# Patient Record
Sex: FEMALE | Race: WHITE | NOT HISPANIC OR LATINO | URBAN - METROPOLITAN AREA
[De-identification: names, ages, dates, MRNs, and addresses within clinical notes are randomized per-mention and may not be internally consistent; named-entity substitution may affect disease eponyms.]

---

## 2023-01-26 ENCOUNTER — APPOINTMENT (RX ONLY)
Dept: URBAN - METROPOLITAN AREA CLINIC 123 | Facility: CLINIC | Age: 38
Setting detail: DERMATOLOGY
End: 2023-01-26

## 2023-01-26 DIAGNOSIS — L82.1 OTHER SEBORRHEIC KERATOSIS: ICD-10-CM

## 2023-01-26 DIAGNOSIS — Z71.89 OTHER SPECIFIED COUNSELING: ICD-10-CM

## 2023-01-26 DIAGNOSIS — L82.0 INFLAMED SEBORRHEIC KERATOSIS: ICD-10-CM

## 2023-01-26 DIAGNOSIS — L81.4 OTHER MELANIN HYPERPIGMENTATION: ICD-10-CM

## 2023-01-26 DIAGNOSIS — D18.0 HEMANGIOMA: ICD-10-CM

## 2023-01-26 DIAGNOSIS — D22 MELANOCYTIC NEVI: ICD-10-CM

## 2023-01-26 DIAGNOSIS — L81.3 CAFÉ AU LAIT SPOTS: ICD-10-CM

## 2023-01-26 DIAGNOSIS — L72.0 EPIDERMAL CYST: ICD-10-CM

## 2023-01-26 PROBLEM — D22.61 MELANOCYTIC NEVI OF RIGHT UPPER LIMB, INCLUDING SHOULDER: Status: ACTIVE | Noted: 2023-01-26

## 2023-01-26 PROBLEM — D22.71 MELANOCYTIC NEVI OF RIGHT LOWER LIMB, INCLUDING HIP: Status: ACTIVE | Noted: 2023-01-26

## 2023-01-26 PROBLEM — D18.01 HEMANGIOMA OF SKIN AND SUBCUTANEOUS TISSUE: Status: ACTIVE | Noted: 2023-01-26

## 2023-01-26 PROCEDURE — ? BENIGN DESTRUCTION

## 2023-01-26 PROCEDURE — ? OBSERVATION AND MEASURE

## 2023-01-26 PROCEDURE — 17110 DESTRUCTION B9 LES UP TO 14: CPT

## 2023-01-26 PROCEDURE — 99203 OFFICE O/P NEW LOW 30 MIN: CPT | Mod: 25

## 2023-01-26 PROCEDURE — ? PRESCRIPTION MEDICATION MANAGEMENT

## 2023-01-26 PROCEDURE — ? COUNSELING

## 2023-01-26 ASSESSMENT — LOCATION DETAILED DESCRIPTION DERM
LOCATION DETAILED: RIGHT LATERAL 3RD TOE
LOCATION DETAILED: RIGHT DISTAL POSTERIOR UPPER ARM
LOCATION DETAILED: RIGHT INFERIOR LATERAL NECK
LOCATION DETAILED: PERIUMBILICAL SKIN
LOCATION DETAILED: RIGHT ANTERIOR SHOULDER
LOCATION DETAILED: RIGHT DISTAL CALF
LOCATION DETAILED: EPIGASTRIC SKIN
LOCATION DETAILED: LEFT CENTRAL MALAR CHEEK

## 2023-01-26 ASSESSMENT — LOCATION SIMPLE DESCRIPTION DERM
LOCATION SIMPLE: RIGHT CALF
LOCATION SIMPLE: RIGHT SHOULDER
LOCATION SIMPLE: ABDOMEN
LOCATION SIMPLE: RIGHT POSTERIOR UPPER ARM
LOCATION SIMPLE: LEFT CHEEK
LOCATION SIMPLE: RIGHT ANTERIOR NECK
LOCATION SIMPLE: RIGHT 3RD TOE

## 2023-01-26 ASSESSMENT — LOCATION ZONE DERM
LOCATION ZONE: NECK
LOCATION ZONE: LEG
LOCATION ZONE: TRUNK
LOCATION ZONE: TOE
LOCATION ZONE: ARM
LOCATION ZONE: FACE

## 2023-01-26 NOTE — PROCEDURE: PRESCRIPTION MEDICATION MANAGEMENT
Render In Strict Bullet Format?: No
Detail Level: Zone
Plan: Discussed treatment options including facials (with extraction) and retinols (or tretinoin) if bothersome. \\nDifferin: Apply a pea-sized amount to a clean, dry face qhs (pt prefers).

## 2023-01-26 NOTE — PROCEDURE: BENIGN DESTRUCTION
Add 52 Modifier (Optional): no
Detail Level: Simple
Anesthesia Volume In Cc: 0.2
Treatment Number (Will Not Render If 0): 1
Medical Necessity Information: It is in your best interest to select a reason for this procedure from the list below. All of these items fulfill various CMS LCD requirements except the new and changing color options.
Post-Care Instructions: I reviewed with the patient in detail post-care instructions. Patient is to wear sunprotection, and avoid picking at any of the treated lesions. Pt may apply Vaseline to crusted or scabbing areas.
Consent: The patient's consent was obtained including but not limited to risks of crusting, scabbing, blistering, scarring, darker or lighter pigmentary change, recurrence, incomplete removal and infection.
Medical Necessity Clause: This procedure was medically necessary because the lesions that were treated were:

## 2024-01-29 ENCOUNTER — APPOINTMENT (RX ONLY)
Dept: URBAN - METROPOLITAN AREA CLINIC 123 | Facility: CLINIC | Age: 39
Setting detail: DERMATOLOGY
End: 2024-01-29

## 2024-01-29 DIAGNOSIS — L81.3 CAFÉ AU LAIT SPOTS: ICD-10-CM | Status: STABLE

## 2024-01-29 DIAGNOSIS — Z71.89 OTHER SPECIFIED COUNSELING: ICD-10-CM

## 2024-01-29 DIAGNOSIS — L81.4 OTHER MELANIN HYPERPIGMENTATION: ICD-10-CM

## 2024-01-29 DIAGNOSIS — D22 MELANOCYTIC NEVI: ICD-10-CM

## 2024-01-29 DIAGNOSIS — L82.1 OTHER SEBORRHEIC KERATOSIS: ICD-10-CM

## 2024-01-29 DIAGNOSIS — D18.0 HEMANGIOMA: ICD-10-CM

## 2024-01-29 PROBLEM — D22.72 MELANOCYTIC NEVI OF LEFT LOWER LIMB, INCLUDING HIP: Status: ACTIVE | Noted: 2024-01-29

## 2024-01-29 PROBLEM — D48.5 NEOPLASM OF UNCERTAIN BEHAVIOR OF SKIN: Status: ACTIVE | Noted: 2024-01-29

## 2024-01-29 PROBLEM — D22.5 MELANOCYTIC NEVI OF TRUNK: Status: ACTIVE | Noted: 2024-01-29

## 2024-01-29 PROBLEM — D18.01 HEMANGIOMA OF SKIN AND SUBCUTANEOUS TISSUE: Status: ACTIVE | Noted: 2024-01-29

## 2024-01-29 PROBLEM — D22.71 MELANOCYTIC NEVI OF RIGHT LOWER LIMB, INCLUDING HIP: Status: ACTIVE | Noted: 2024-01-29

## 2024-01-29 PROCEDURE — ? OBSERVATION

## 2024-01-29 PROCEDURE — 11102 TANGNTL BX SKIN SINGLE LES: CPT

## 2024-01-29 PROCEDURE — 99213 OFFICE O/P EST LOW 20 MIN: CPT | Mod: 25

## 2024-01-29 PROCEDURE — ? BIOPSY BY SHAVE METHOD

## 2024-01-29 PROCEDURE — ? OBSERVATION AND MEASURE

## 2024-01-29 PROCEDURE — ? COUNSELING

## 2024-01-29 ASSESSMENT — LOCATION SIMPLE DESCRIPTION DERM
LOCATION SIMPLE: RIGHT 3RD TOE
LOCATION SIMPLE: ABDOMEN
LOCATION SIMPLE: RIGHT CHEEK
LOCATION SIMPLE: LEFT UPPER BACK
LOCATION SIMPLE: RIGHT UPPER BACK
LOCATION SIMPLE: LEFT UPPER ARM
LOCATION SIMPLE: CHEST
LOCATION SIMPLE: RIGHT ANTERIOR NECK
LOCATION SIMPLE: LEFT FOOT

## 2024-01-29 ASSESSMENT — LOCATION DETAILED DESCRIPTION DERM
LOCATION DETAILED: LEFT DORSAL FOOT
LOCATION DETAILED: RIGHT INFERIOR LATERAL NECK
LOCATION DETAILED: RIGHT MEDIAL SUPERIOR CHEST
LOCATION DETAILED: RIGHT LATERAL ABDOMEN
LOCATION DETAILED: RIGHT LATERAL 3RD TOE
LOCATION DETAILED: LEFT DISTAL POSTERIOR UPPER ARM
LOCATION DETAILED: RIGHT CENTRAL MALAR CHEEK
LOCATION DETAILED: RIGHT SUPERIOR MEDIAL UPPER BACK
LOCATION DETAILED: LEFT INFERIOR MEDIAL UPPER BACK

## 2024-01-29 ASSESSMENT — LOCATION ZONE DERM
LOCATION ZONE: FEET
LOCATION ZONE: TRUNK
LOCATION ZONE: TOE
LOCATION ZONE: ARM
LOCATION ZONE: FACE
LOCATION ZONE: NECK